# Patient Record
Sex: MALE | Race: OTHER
[De-identification: names, ages, dates, MRNs, and addresses within clinical notes are randomized per-mention and may not be internally consistent; named-entity substitution may affect disease eponyms.]

---

## 2017-02-15 ENCOUNTER — APPOINTMENT (OUTPATIENT)
Dept: UROLOGY | Facility: CLINIC | Age: 82
End: 2017-02-15

## 2017-11-08 ENCOUNTER — APPOINTMENT (OUTPATIENT)
Dept: HEART AND VASCULAR | Facility: CLINIC | Age: 82
End: 2017-11-08
Payer: MEDICARE

## 2017-11-08 PROCEDURE — 93015 CV STRESS TEST SUPVJ I&R: CPT

## 2017-11-08 PROCEDURE — A9500: CPT

## 2017-11-08 PROCEDURE — 78452 HT MUSCLE IMAGE SPECT MULT: CPT

## 2018-08-01 ENCOUNTER — APPOINTMENT (OUTPATIENT)
Dept: UROLOGY | Facility: CLINIC | Age: 83
End: 2018-08-01
Payer: MEDICARE

## 2018-08-01 VITALS
HEIGHT: 67 IN | TEMPERATURE: 97.6 F | SYSTOLIC BLOOD PRESSURE: 174 MMHG | HEART RATE: 61 BPM | BODY MASS INDEX: 24.33 KG/M2 | DIASTOLIC BLOOD PRESSURE: 71 MMHG | OXYGEN SATURATION: 98 % | WEIGHT: 155 LBS

## 2018-08-01 DIAGNOSIS — C78.00 MALIGNANT NEOPLASM OF PROSTATE: ICD-10-CM

## 2018-08-01 DIAGNOSIS — C61 MALIGNANT NEOPLASM OF PROSTATE: ICD-10-CM

## 2018-08-01 PROCEDURE — 99213 OFFICE O/P EST LOW 20 MIN: CPT

## 2020-03-17 DIAGNOSIS — N28.89 OTHER SPECIFIED DISORDERS OF KIDNEY AND URETER: ICD-10-CM

## 2020-05-04 ENCOUNTER — APPOINTMENT (OUTPATIENT)
Dept: NEPHROLOGY | Facility: CLINIC | Age: 85
End: 2020-05-04

## 2020-05-05 ENCOUNTER — APPOINTMENT (OUTPATIENT)
Dept: NEPHROLOGY | Facility: CLINIC | Age: 85
End: 2020-05-05
Payer: MEDICARE

## 2020-05-05 VITALS
BODY MASS INDEX: 24.01 KG/M2 | DIASTOLIC BLOOD PRESSURE: 79 MMHG | HEIGHT: 67 IN | SYSTOLIC BLOOD PRESSURE: 128 MMHG | HEART RATE: 84 BPM | WEIGHT: 153 LBS

## 2020-05-05 DIAGNOSIS — E11.9 TYPE 2 DIABETES MELLITUS W/OUT COMPLICATIONS: ICD-10-CM

## 2020-05-05 DIAGNOSIS — Z86.79 PERSONAL HISTORY OF OTHER DISEASES OF THE CIRCULATORY SYSTEM: ICD-10-CM

## 2020-05-05 DIAGNOSIS — I10 ESSENTIAL (PRIMARY) HYPERTENSION: ICD-10-CM

## 2020-05-05 DIAGNOSIS — E83.52 HYPERCALCEMIA: ICD-10-CM

## 2020-05-05 DIAGNOSIS — N39.3 STRESS INCONTINENCE (FEMALE) (MALE): ICD-10-CM

## 2020-05-05 DIAGNOSIS — Z86.39 PERSONAL HISTORY OF OTHER ENDOCRINE, NUTRITIONAL AND METABOLIC DISEASE: ICD-10-CM

## 2020-05-05 DIAGNOSIS — Z83.3 FAMILY HISTORY OF DIABETES MELLITUS: ICD-10-CM

## 2020-05-05 PROCEDURE — 99204 OFFICE O/P NEW MOD 45 MIN: CPT | Mod: 95

## 2020-05-05 NOTE — PHYSICAL EXAM
[General Appearance - Alert] : alert [General Appearance - In No Acute Distress] : in no acute distress [Sclera] : the sclera and conjunctiva were normal [PERRL With Normal Accommodation] : pupils were equal in size, round, and reactive to light [Outer Ear] : the ears and nose were normal in appearance [Neck Appearance] : the appearance of the neck was normal [Neck Cervical Mass (___cm)] : no neck mass was observed [Skin Color & Pigmentation] : normal skin color and pigmentation [Skin Turgor] : normal skin turgor [] : no rash [Deep Tendon Reflexes (DTR)] : deep tendon reflexes were 2+ and symmetric [Sensation] : the sensory exam was normal to light touch and pinprick [No Focal Deficits] : no focal deficits [Oriented To Time, Place, And Person] : oriented to person, place, and time [Impaired Insight] : insight and judgment were intact [Affect] : the affect was normal

## 2020-05-05 NOTE — HISTORY OF PRESENT ILLNESS
[Home] : at home, [unfilled] , at the time of the visit. [Medical Office: ([facility])___] : at the medical office located in  [Other:____] : [unfilled] [Self] : self [Patient] : the patient [FreeTextEntry1] : Discussed with patient : You have chosen to receive care through the use of tele-media.  It enables healthcare providers at different locations to provide safe, effective, and convenient care through the use of technology.  Please note this is a billable encounter.  As with any healthcare service, there are risks associated with the use of tele-media, including  issues.  You understand that I cannot physically examine you and that you may need to come to the office to complete the assessment.   Patient agreed verbally and understands the risks and benefits of tele-media as explained.  All questions regarding tele-media encounters were answered.                                                                                                                                                                                                                                                                                     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  He had radical prostatectomy by Dr. James Hough probably back in 2016.  He has exhibited a PSA of 40 in 2018 and had a lung nodule seen on CT in 2016 as well as adrenal nodules.  His BP and sugar are said to be good at home.  He does not complain of bone pain or shortness of breath.  His medications include losartan/hydrochlorothiazide 100/25 mg daily, as well as atorvastatin and metformin and unknown doses\par

## 2020-05-05 NOTE — ASSESSMENT
[FreeTextEntry1] : 84-year-old man with a history of hypertension, type 2 diabetes, hypercalcemia, prostate CA with mets to lung and possibly adrenals.  He has had modest elevation of creatinine and calcium in the past but his latest labs show a creatinine of 0.87 and a calcium of 10 with no proteinuria and a potassium of 4.7, all done in November.  In the absence of proteinuria and a creatinine of 0.87, it appears that he has little or no diabetic nephropathy.  He also no longer has hypercalcemia.  His urine incontinence is clearly a lower tract issue.  I am pleased to see that renal function is relatively good and he has lived remarkably with distant metastases from prostate for several years now and hopefully will continue to.

## 2025-07-14 ENCOUNTER — OFFICE (OUTPATIENT)
Dept: URBAN - METROPOLITAN AREA CLINIC 28 | Facility: CLINIC | Age: OVER 89
Setting detail: OPHTHALMOLOGY
End: 2025-07-14
Payer: MEDICARE

## 2025-07-14 DIAGNOSIS — H02.132: ICD-10-CM

## 2025-07-14 DIAGNOSIS — H31.003: ICD-10-CM

## 2025-07-14 DIAGNOSIS — H25.13: ICD-10-CM

## 2025-07-14 DIAGNOSIS — H40.013: ICD-10-CM

## 2025-07-14 DIAGNOSIS — H16.223: ICD-10-CM

## 2025-07-14 DIAGNOSIS — H25.12: ICD-10-CM

## 2025-07-14 PROBLEM — Z96.1 PSEUDOPHAKIA ; RIGHT EYE: Status: ACTIVE | Noted: 2025-07-14

## 2025-07-14 PROCEDURE — 92136 OPHTHALMIC BIOMETRY: CPT | Mod: TC | Performed by: OPHTHALMOLOGY

## 2025-07-14 PROCEDURE — 92202 OPSCPY EXTND ON/MAC DRAW: CPT | Performed by: OPHTHALMOLOGY

## 2025-07-14 PROCEDURE — 92004 COMPRE OPH EXAM NEW PT 1/>: CPT | Performed by: OPHTHALMOLOGY

## 2025-07-14 PROCEDURE — 92133 CPTRZD OPH DX IMG PST SGM ON: CPT | Performed by: OPHTHALMOLOGY

## 2025-07-14 PROCEDURE — 76514 ECHO EXAM OF EYE THICKNESS: CPT | Performed by: OPHTHALMOLOGY

## 2025-07-14 PROCEDURE — 92136 OPHTHALMIC BIOMETRY: CPT | Mod: LT | Performed by: OPHTHALMOLOGY

## 2025-07-14 ASSESSMENT — KERATOMETRY
OS_AXISANGLE2_DEGREES: 020
OD_CYLPOWER_DEGREES: 1.5
OS_AXISANGLE_DEGREES: 110
OS_CYLAXISANGLE_DEGREES: 020
OD_K2POWER_DIOPTERS: 43.75
OD_K1K2_AVERAGE: 43
OS_AXISANGLE_DEGREES: 020
OD_AXISANGLE2_DEGREES: 180
OD_K1POWER_DIOPTERS: 42.25
OS_K1K2_AVERAGE: 44
OD_AXISANGLE_DEGREES: 180
OS_K2POWER_DIOPTERS: 44.50
OS_CYLPOWER_DEGREES: 1
OS_K1POWER_DIOPTERS: 43.50
OD_K2POWER_DIOPTERS: 43.75
OS_K1POWER_DIOPTERS: 43.50
OD_CYLAXISANGLE_DEGREES: 180
OS_K2POWER_DIOPTERS: 44.50
OD_AXISANGLE_DEGREES: 90
OD_K1POWER_DIOPTERS: 42.25

## 2025-07-14 ASSESSMENT — CONFRONTATIONAL VISUAL FIELD TEST (CVF)
OS_FINDINGS: FULL
OD_FINDINGS: FULL

## 2025-07-14 ASSESSMENT — REFRACTION_AUTOREFRACTION
OD_AXIS: 099
OS_CYLINDER: -1.25
OS_AXIS: 084
OD_SPHERE: +0.50
OD_CYLINDER: -2.00
OS_SPHERE: +1.25

## 2025-07-14 ASSESSMENT — LID POSITION - ECTROPION: OD_ECTROPION: RLL 2+ 3+

## 2025-07-14 ASSESSMENT — PACHYMETRY
OD_CT_UM: 472
OD_CT_CORRECTION: 5
OS_CT_UM: 504
OS_CT_CORRECTION: 3

## 2025-07-14 ASSESSMENT — SUPERFICIAL PUNCTATE KERATITIS (SPK)
OD_SPK: T
OS_SPK: T

## 2025-07-14 ASSESSMENT — LID EXAM ASSESSMENTS
OS_MEIBOMITIS: 2+
OD_MEIBOMITIS: 2+

## 2025-07-14 ASSESSMENT — TONOMETRY
OD_IOP_MMHG: 21
OS_IOP_MMHG: 20

## 2025-07-14 ASSESSMENT — VISUAL ACUITY
OD_BCVA: 20/60
OS_BCVA: 20/50+2